# Patient Record
Sex: FEMALE | Race: WHITE | NOT HISPANIC OR LATINO | Employment: STUDENT | URBAN - METROPOLITAN AREA
[De-identification: names, ages, dates, MRNs, and addresses within clinical notes are randomized per-mention and may not be internally consistent; named-entity substitution may affect disease eponyms.]

---
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
## 2025-05-15 ENCOUNTER — APPOINTMENT (OUTPATIENT)
Dept: PRIMARY CARE | Facility: CLINIC | Age: 20
End: 2025-05-15
Payer: COMMERCIAL

## 2025-05-15 VITALS
DIASTOLIC BLOOD PRESSURE: 72 MMHG | SYSTOLIC BLOOD PRESSURE: 110 MMHG | HEIGHT: 65 IN | WEIGHT: 156.6 LBS | HEART RATE: 90 BPM | BODY MASS INDEX: 26.09 KG/M2

## 2025-05-15 DIAGNOSIS — E05.00 GRAVES' DISEASE: Primary | ICD-10-CM

## 2025-05-15 DIAGNOSIS — F41.9 ANXIETY: ICD-10-CM

## 2025-05-15 DIAGNOSIS — E89.0 POSTABLATIVE HYPOTHYROIDISM: ICD-10-CM

## 2025-05-15 DIAGNOSIS — J45.20 MILD INTERMITTENT ASTHMA WITHOUT COMPLICATION (HHS-HCC): ICD-10-CM

## 2025-05-15 PROBLEM — S99.921A RIGHT FOOT INJURY, INITIAL ENCOUNTER: Status: RESOLVED | Noted: 2025-05-15 | Resolved: 2025-05-15

## 2025-05-15 PROBLEM — S93.409A SPRAIN OF ANKLE: Status: RESOLVED | Noted: 2023-09-21 | Resolved: 2025-05-15

## 2025-05-15 PROBLEM — J02.9 SORE THROAT: Status: RESOLVED | Noted: 2025-05-15 | Resolved: 2025-05-15

## 2025-05-15 PROBLEM — S59.909A ELBOW INJURY: Status: RESOLVED | Noted: 2025-05-15 | Resolved: 2025-05-15

## 2025-05-15 PROBLEM — J30.1 SEASONAL ALLERGIC RHINITIS DUE TO POLLEN: Status: RESOLVED | Noted: 2024-05-23 | Resolved: 2025-05-15

## 2025-05-15 PROCEDURE — 1036F TOBACCO NON-USER: CPT | Performed by: STUDENT IN AN ORGANIZED HEALTH CARE EDUCATION/TRAINING PROGRAM

## 2025-05-15 PROCEDURE — 99204 OFFICE O/P NEW MOD 45 MIN: CPT | Performed by: STUDENT IN AN ORGANIZED HEALTH CARE EDUCATION/TRAINING PROGRAM

## 2025-05-15 PROCEDURE — 3008F BODY MASS INDEX DOCD: CPT | Performed by: STUDENT IN AN ORGANIZED HEALTH CARE EDUCATION/TRAINING PROGRAM

## 2025-05-15 RX ORDER — LIOTHYRONINE SODIUM 5 UG/1
TABLET ORAL
COMMUNITY

## 2025-05-15 RX ORDER — LEVOTHYROXINE SODIUM 137 UG/1
137 TABLET ORAL
COMMUNITY
Start: 2025-03-03

## 2025-05-15 RX ORDER — NORETHINDRONE ACETATE/ETHINYL ESTRADIOL AND FERROUS FUMARATE 1MG-20(21)
1 KIT ORAL
COMMUNITY
Start: 2025-04-03

## 2025-05-15 RX ORDER — SILVER SULFADIAZINE 10 G/1000G
CREAM TOPICAL 2 TIMES DAILY
COMMUNITY
Start: 2025-03-05

## 2025-05-15 RX ORDER — ALBUTEROL SULFATE 90 UG/1
2 INHALANT RESPIRATORY (INHALATION) EVERY 6 HOURS PRN
COMMUNITY
Start: 2024-09-09

## 2025-05-15 ASSESSMENT — PATIENT HEALTH QUESTIONNAIRE - PHQ9
10. IF YOU CHECKED OFF ANY PROBLEMS, HOW DIFFICULT HAVE THESE PROBLEMS MADE IT FOR YOU TO DO YOUR WORK, TAKE CARE OF THINGS AT HOME, OR GET ALONG WITH OTHER PEOPLE: NOT DIFFICULT AT ALL
2. FEELING DOWN, DEPRESSED OR HOPELESS: NOT AT ALL
SUM OF ALL RESPONSES TO PHQ9 QUESTIONS 1 AND 2: 0
1. LITTLE INTEREST OR PLEASURE IN DOING THINGS: NOT AT ALL

## 2025-05-15 ASSESSMENT — ENCOUNTER SYMPTOMS
ABDOMINAL PAIN: 0
RHINORRHEA: 0
LOSS OF SENSATION IN FEET: 0
CONSTIPATION: 0
DEPRESSION: 0
COUGH: 0
VOMITING: 0
DIARRHEA: 0
OCCASIONAL FEELINGS OF UNSTEADINESS: 0
WEAKNESS: 0
SHORTNESS OF BREATH: 0
CHILLS: 0
FEVER: 0
NERVOUS/ANXIOUS: 1
NUMBNESS: 0
HEADACHES: 0
DIZZINESS: 0
LIGHT-HEADEDNESS: 0
NAUSEA: 0
MYALGIAS: 0
FATIGUE: 0
SLEEP DISTURBANCE: 0
ARTHRALGIAS: 0
DYSPHORIC MOOD: 0

## 2025-05-15 ASSESSMENT — ANXIETY QUESTIONNAIRES
7. FEELING AFRAID AS IF SOMETHING AWFUL MIGHT HAPPEN: SEVERAL DAYS
GAD7 TOTAL SCORE: 10
6. BECOMING EASILY ANNOYED OR IRRITABLE: SEVERAL DAYS
3. WORRYING TOO MUCH ABOUT DIFFERENT THINGS: MORE THAN HALF THE DAYS
1. FEELING NERVOUS, ANXIOUS, OR ON EDGE: MORE THAN HALF THE DAYS
5. BEING SO RESTLESS THAT IT IS HARD TO SIT STILL: MORE THAN HALF THE DAYS
IF YOU CHECKED OFF ANY PROBLEMS ON THIS QUESTIONNAIRE, HOW DIFFICULT HAVE THESE PROBLEMS MADE IT FOR YOU TO DO YOUR WORK, TAKE CARE OF THINGS AT HOME, OR GET ALONG WITH OTHER PEOPLE: VERY DIFFICULT
2. NOT BEING ABLE TO STOP OR CONTROL WORRYING: MORE THAN HALF THE DAYS
4. TROUBLE RELAXING: NOT AT ALL

## 2025-05-15 NOTE — PROGRESS NOTES
Subjective   Patient ID: Dalia Holland is a 20 y.o. female who presents for New patient to establish care .    HPI     Patient is coming into establish care here in the office.  She was previously being followed by pediatrics.  No acute concerns or complaints today.    She does follow still with endocrinology through [facility].  They are continuing with monitoring and managing her medications.    Patient had been followed in the past for anxiety.  She had previously [facility] she was about 12-14. They thought more of a combination of anxiety, depression and early bipolar.  It had been discussed previously to go on an SSRI but she had decided to hold off.  She was also in counseling previously with [facility] that started in April 2025.  They have talked about that if symptoms worsen, referring to psychiatry.  She does not want to be on any medications at this time.    Her cat has always been able to help calm down her anxious symptoms.  She has had severe panic attacks in the past and the animal has helped in terms of providing comfort, as well as providing a weight on her while holding her to help her calm down.  Her familiarity with her cat also significant;y benefits her in terms of her feeling safe with the animal which also helps her to calm down.  She has had increased anxiety at school and is interested in being able to have her cat with her as she definitely feels like this would be beneficial especially with her managing her school workload.  She is not exactly sure if she would be able to have a roommate then with the cat.  She is also uncertain about having someone else with her cat as that thought also increases her anxiety.  Patient has been able to properly care for her cat for years and does not believe that she would have any issues doing this while maintaining her schoolwork.  She is worried that not being able to have the With her would be a detriment  "to her work at [employer].      Review of Systems   Constitutional:  Negative for chills, fatigue and fever.   HENT:  Negative for congestion and rhinorrhea.    Respiratory:  Negative for cough and shortness of breath.    Cardiovascular:  Negative for chest pain.   Gastrointestinal:  Negative for abdominal pain, constipation, diarrhea, nausea and vomiting.   Musculoskeletal:  Negative for arthralgias and myalgias.   Neurological:  Negative for dizziness, weakness, light-headedness, numbness and headaches.   Psychiatric/Behavioral:  Negative for dysphoric mood, self-injury, sleep disturbance and suicidal ideas. The patient is nervous/anxious.        Objective   /72 (BP Location: Right arm, Patient Position: Sitting)   Pulse 90   Ht 1.638 m (5' 4.5\")   Wt 71 kg (156 lb 9.6 oz)   BMI 26.47 kg/m²     Physical Exam  Vitals and nursing note reviewed.   Constitutional:       General: She is not in acute distress.     Appearance: Normal appearance. She is normal weight. She is not ill-appearing or toxic-appearing.   HENT:      Head: Normocephalic and atraumatic.   Cardiovascular:      Rate and Rhythm: Normal rate and regular rhythm.      Heart sounds: Normal heart sounds.   Pulmonary:      Effort: Pulmonary effort is normal.      Breath sounds: Normal breath sounds.   Abdominal:      General: Abdomen is flat. Bowel sounds are normal.      Palpations: Abdomen is soft.   Neurological:      General: No focal deficit present.      Mental Status: She is alert and oriented to person, place, and time. Mental status is at baseline.   Psychiatric:         Mood and Affect: Mood is anxious.         Behavior: Behavior normal.         Assessment/Plan   Problem List Items Addressed This Visit           ICD-10-CM    Anxiety F41.9    Diagnosed around ages 12-14. Currently following with counseling with [facility]. Defers medications at this time.         Asthma, intermittent ([facility]) J45.20    Chronic. Stable. Well controlled with " albuterol inhaler as needed.         Graves' disease - Primary E05.00    Diagnosed around 2019. Following with endocrinology still with [facility]. She had an ablation done in 2020. She is currently on Levoxyl and Cytomel.         Postablative hypothyroidism E89.0    Following with endocrinology still with [facility]. She had an ablation done in 2020 for Grave's Disease. She is currently on Levoxyl and Cytomel.          History and physical examination as above.  Patient presenting to establish care in the office.  Updated some of patient's medical history within the chart.  She will continue with regular follow-up with her endocrinologist through [facility] and they will continue with their current medications.  Discussed anxiety as this has been longstanding since she was about 12 years old. GAD7- score of 10 with rating very difficult with school. Discussed current treatment plan with regular therapy.  We did discuss consideration for medication in the future but she does not want to move forward with this at the time.  She states that her cat provide significant benefit with getting her through panic attacks as well as decreasing her overall levels of anxiety.  We do agree that this likely would be very beneficial to have with her at school especially since this also increases her anxiety by not having the cat there.  We will provide documentation with our concerns and recommendations.  Plan for wellness examination in the spring 2026.  We will plan for lab work at that point.  Discussed recommended dietary and exercise considerations as well.  She will call with any other questions or concerns or if any new issues arise.

## 2025-05-15 NOTE — ASSESSMENT & PLAN NOTE
Following with endocrinology still with [facility]. She had an ablation done in 2020 for Grave's Disease. She is currently on Levoxyl and Cytomel.

## 2025-05-15 NOTE — ASSESSMENT & PLAN NOTE
Diagnosed around ages 12-14. Currently following with counseling with [facility]. Defers medications at this time.

## 2025-05-15 NOTE — ASSESSMENT & PLAN NOTE
Diagnosed around 2019. Following with endocrinology still with [facility]. She had an ablation done in 2020. She is currently on Levoxyl and Cytomel.

## 2025-05-15 NOTE — PATIENT INSTRUCTIONS
Thank you for coming in and establishing with us today in the office.    I went ahead and updated some of your medical history as well.    Please continue with regular follow-up with your endocrinologist through [facility].    We also did discuss your anxiety as well as the history in terms of diagnosis  as well as current treatment.  Please continue with regular follow-up with your therapist on a regular basis which is usually my first recommended treatment as well.  It sounds like they have a plan in place that if anything progresses or worsens, they have in mind referrals that they can placed to psychiatry.  They can also call our office if they need us to send in any sort of referral if needed.  We did discuss the role of medications and possible need in the future.  This would largely depend on progression of symptoms and as long as you continue to slowly improve and are overall stable, we definitely do not need to start medication at this time.    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  Please send a message if you have not received word from us that this has been completed by this point next week.    You did just recently have your wellness examination already this year and would not be due until the spring 2026.  We can plan for follow-up for a wellness visit at that time.  You are free to get that scheduled today if you would like to.    We did discuss overall aspects of nutrition and I recommend plenty of protein as well as vegetables and fruits, avoiding processed foods and as much added sugar as possible and avoid drinking calories.  Keep up the work with excellent training in terms of weight training.  I would definitely recommend cardiovascular activity as well both steady-state as well as some higher intensity work as you are able to.  It does sound like you have had issues with heart in the past when they had  initially diagnosed you with Graves' disease.  If this continues, we can follow-up in the office for additional testing if needed.    Please call with any additional questions or concerns.    Thank you

## 2025-06-05 DIAGNOSIS — J45.20 MILD INTERMITTENT ASTHMA WITHOUT COMPLICATION (HHS-HCC): Primary | ICD-10-CM

## 2025-06-05 RX ORDER — ALBUTEROL SULFATE 90 UG/1
2 INHALANT RESPIRATORY (INHALATION) EVERY 6 HOURS PRN
Qty: 18 G | Refills: 1 | Status: SHIPPED | OUTPATIENT
Start: 2025-06-05

## 2025-06-27 ENCOUNTER — OFFICE VISIT (OUTPATIENT)
Dept: PRIMARY CARE | Facility: CLINIC | Age: 20
End: 2025-06-27
Payer: COMMERCIAL

## 2025-06-27 VITALS
OXYGEN SATURATION: 97 % | SYSTOLIC BLOOD PRESSURE: 110 MMHG | BODY MASS INDEX: 27.24 KG/M2 | HEART RATE: 88 BPM | DIASTOLIC BLOOD PRESSURE: 70 MMHG | WEIGHT: 161.2 LBS | RESPIRATION RATE: 18 BRPM

## 2025-06-27 DIAGNOSIS — J01.00 ACUTE NON-RECURRENT MAXILLARY SINUSITIS: Primary | ICD-10-CM

## 2025-06-27 DIAGNOSIS — J02.9 SORE THROAT: ICD-10-CM

## 2025-06-27 LAB
POC BINAX EXPIRATION: NORMAL
POC BINAX NOW COVID SERIAL NUMBER: NORMAL
POC RAPID STREP: NEGATIVE
POC SARS-COV-2 AG BINAX: NORMAL

## 2025-06-27 PROCEDURE — 87811 SARS-COV-2 COVID19 W/OPTIC: CPT | Performed by: NURSE PRACTITIONER

## 2025-06-27 PROCEDURE — 87880 STREP A ASSAY W/OPTIC: CPT | Performed by: NURSE PRACTITIONER

## 2025-06-27 PROCEDURE — 99213 OFFICE O/P EST LOW 20 MIN: CPT | Performed by: NURSE PRACTITIONER

## 2025-06-27 PROCEDURE — 1036F TOBACCO NON-USER: CPT | Performed by: NURSE PRACTITIONER

## 2025-06-27 RX ORDER — OXYMETAZOLINE HYDROCHLORIDE 0.05 G/100ML
2 SPRAY, METERED NASAL EVERY 12 HOURS PRN
Qty: 30 ML | Refills: 0 | Status: SHIPPED | OUTPATIENT
Start: 2025-06-27 | End: 2025-06-29

## 2025-06-27 RX ORDER — NORETHINDRONE ACETATE/ETHINYL ESTRADIOL 1MG-20MCG
1 KIT ORAL
COMMUNITY
Start: 2025-06-20

## 2025-06-27 ASSESSMENT — PATIENT HEALTH QUESTIONNAIRE - PHQ9
1. LITTLE INTEREST OR PLEASURE IN DOING THINGS: NOT AT ALL
SUM OF ALL RESPONSES TO PHQ9 QUESTIONS 1 AND 2: 0
2. FEELING DOWN, DEPRESSED OR HOPELESS: NOT AT ALL

## 2025-06-27 ASSESSMENT — COLUMBIA-SUICIDE SEVERITY RATING SCALE - C-SSRS
2. HAVE YOU ACTUALLY HAD ANY THOUGHTS OF KILLING YOURSELF?: NO
6. HAVE YOU EVER DONE ANYTHING, STARTED TO DO ANYTHING, OR PREPARED TO DO ANYTHING TO END YOUR LIFE?: NO
1. IN THE PAST MONTH, HAVE YOU WISHED YOU WERE DEAD OR WISHED YOU COULD GO TO SLEEP AND NOT WAKE UP?: NO

## 2025-06-27 NOTE — PROGRESS NOTES
Subjective   Patient ID: Dalia Holland is a 20 y.o. female who presents for URI (Started 2 days ago- drainage, clear phlegm, sinus pressure, slight ear pain ).    HPI     Patient reports 4 day history of sinus pressure, runny nose, nasal congestion, post-nasal drip, and slight bilateral ear pain, headaches, sore throat, and sneezing. Denies fevers, chills, body aches, chest pain, wheezing, shortness of breath, or cough. Denies nausea, vomiting, or diarrhea. She took Zyrtec, claritin, and benadryl without relief.     Review of Systems  ROS negative except as noted above in HPI.     Objective   /70 (BP Location: Left arm, Patient Position: Sitting, BP Cuff Size: Adult)   Pulse 88   Resp 18   Wt 73.1 kg (161 lb 3.2 oz)   SpO2 97%   BMI 27.24 kg/m²     Physical Exam  General: Alert and oriented, in no acute distress. Appears stated age, well-nourished, and well hydrated  HEENT:  - Head: Normocephalic and atraumatic   - Eyes: EOMI, PERRLA  - ENT: Hearing grossly intact. Mucus membranes pink and moist without lesions. Tonsils slightly enlarged with exudate. Good dentition. TMs gray. TTP of maxillary sinuses.   Neck: Supple. No stiffness. No thyromegaly or thyroid nodules  Heart: RRR. No murmurs, clicks, or rubs  Lungs: Unlabored breathing. CTAB with no crackles, wheezes, or rhonchi  Abdomen: Normal BS in all 4 quadrants. Soft, non-tender, non-distended, with no masses  Musculoskeletal: Normal gait and station  Neurological: Alert and oriented. No gross neurological deficits  Psychological: Appropriate mood and affect  Skin: No rash, abnormal lesions, cyanosis, or erythema     Assessment/Plan   Diagnoses and all orders for this visit:  Acute non-recurrent maxillary sinusitis  Sore throat  - POCT Rapid Strep A manually resulted: negative  - POCT BinaxNOW Covid-19 Ag Card manually resulted: negative  - oxymetazoline (Afrin, oxymetazoline,) 0.05 % nasal spray; Administer 2 sprays into each nostril every 12  hours if needed for congestion for up to 2 days. Do not use for more than 3 days.  - Recommend use of Flonase as well  - Rest, fluids  - If no improvement by 6/30, can call and I will send in antibiotic     ANH Mireles-CNP  [facility]

## 2025-06-30 ENCOUNTER — TELEPHONE (OUTPATIENT)
Dept: PRIMARY CARE | Facility: CLINIC | Age: 20
End: 2025-06-30
Payer: COMMERCIAL

## 2025-06-30 DIAGNOSIS — J01.00 ACUTE NON-RECURRENT MAXILLARY SINUSITIS: Primary | ICD-10-CM

## 2025-06-30 RX ORDER — AMOXICILLIN AND CLAVULANATE POTASSIUM 875; 125 MG/1; MG/1
875 TABLET, FILM COATED ORAL 2 TIMES DAILY
Qty: 14 TABLET | Refills: 0 | Status: SHIPPED | OUTPATIENT
Start: 2025-06-30 | End: 2025-07-07

## 2025-06-30 RX ORDER — FLUCONAZOLE 150 MG/1
TABLET ORAL
Qty: 3 TABLET | Refills: 0 | Status: SHIPPED | OUTPATIENT
Start: 2025-06-30

## 2025-06-30 NOTE — TELEPHONE ENCOUNTER
Pt states she is still experiencing since pressure, drainage & headache.  Would like to know if an antibiotic can be called in?  Also please call in diflucan for yeast infection caused by the antibiotic.    Dalia # [phone]  [facility] # [phone]

## 2025-07-21 ENCOUNTER — OFFICE VISIT (OUTPATIENT)
Dept: PRIMARY CARE | Facility: CLINIC | Age: 20
End: 2025-07-21
Payer: COMMERCIAL

## 2025-07-21 VITALS
BODY MASS INDEX: 27.24 KG/M2 | DIASTOLIC BLOOD PRESSURE: 78 MMHG | WEIGHT: 161.2 LBS | OXYGEN SATURATION: 98 % | HEART RATE: 84 BPM | RESPIRATION RATE: 19 BRPM | SYSTOLIC BLOOD PRESSURE: 120 MMHG

## 2025-07-21 DIAGNOSIS — M25.571 CHRONIC PAIN OF BOTH ANKLES: ICD-10-CM

## 2025-07-21 DIAGNOSIS — M79.671 BILATERAL FOOT PAIN: Primary | ICD-10-CM

## 2025-07-21 DIAGNOSIS — M25.561 ACUTE PAIN OF RIGHT KNEE: ICD-10-CM

## 2025-07-21 DIAGNOSIS — M25.572 CHRONIC PAIN OF BOTH ANKLES: ICD-10-CM

## 2025-07-21 DIAGNOSIS — M79.672 BILATERAL FOOT PAIN: Primary | ICD-10-CM

## 2025-07-21 DIAGNOSIS — G89.29 CHRONIC PAIN OF BOTH ANKLES: ICD-10-CM

## 2025-07-21 PROCEDURE — 99213 OFFICE O/P EST LOW 20 MIN: CPT | Performed by: STUDENT IN AN ORGANIZED HEALTH CARE EDUCATION/TRAINING PROGRAM

## 2025-07-21 RX ORDER — NORELGESTROMIN AND ETHINYL ESTRADIOL 35; 150 UG/D; UG/D
PATCH TRANSDERMAL
COMMUNITY
Start: 2025-07-03

## 2025-07-21 ASSESSMENT — ENCOUNTER SYMPTOMS
COUGH: 0
CHILLS: 0
HEADACHES: 0
FEVER: 0
SHORTNESS OF BREATH: 0
FATIGUE: 0
ARTHRALGIAS: 0
LIGHT-HEADEDNESS: 0
DIZZINESS: 0
ABDOMINAL PAIN: 0
MYALGIAS: 0
RHINORRHEA: 0

## 2025-07-21 ASSESSMENT — PATIENT HEALTH QUESTIONNAIRE - PHQ9
2. FEELING DOWN, DEPRESSED OR HOPELESS: NOT AT ALL
SUM OF ALL RESPONSES TO PHQ9 QUESTIONS 1 AND 2: 0
1. LITTLE INTEREST OR PLEASURE IN DOING THINGS: NOT AT ALL

## 2025-07-21 NOTE — PROGRESS NOTES
Subjective   Patient ID: Dalia Holland is a 20 y.o. female who presents for Knee Pain (Right knee x 3 days) and Ankle Pain (Bilateral pain into feet x 15 years getting worse).    Knee Pain     Ankle Pain          Patient is coming in with overall musculoskeletal pains.  She states that her right knee started hurting her about 3 days ago.  She denies any recent trauma or injury.    She states that she has been having some bilateral ankle and feet pain that has been going overall further about the past 15 years but she does feel like it seems to be getting worse.  She has been to see multiple doctors and physical    Review of Systems   Constitutional:  Negative for chills, fatigue and fever.   HENT:  Negative for congestion and rhinorrhea.    Respiratory:  Negative for cough and shortness of breath.    Cardiovascular:  Negative for chest pain.   Gastrointestinal:  Negative for abdominal pain.   Musculoskeletal:  Negative for arthralgias and myalgias.   Neurological:  Negative for dizziness, light-headedness and headaches.       Objective   /78 (BP Location: Left arm, Patient Position: Sitting, BP Cuff Size: Adult)   Pulse 84   Resp 19   Wt 73.1 kg (161 lb 3.2 oz)   SpO2 98%   BMI 27.24 kg/m²     Physical Exam  Vitals and nursing note reviewed.   Constitutional:       General: She is not in acute distress.     Appearance: Normal appearance. She is normal weight. She is not ill-appearing or toxic-appearing.   HENT:      Head: Normocephalic and atraumatic.     Cardiovascular:      Rate and Rhythm: Normal rate and regular rhythm.      Heart sounds: Normal heart sounds.   Pulmonary:      Effort: Pulmonary effort is normal.      Breath sounds: Normal breath sounds.     Neurological:      Mental Status: She is alert.         Assessment/Plan   Problem List Items Addressed This Visit    None  Visit Diagnoses         Codes      Bilateral foot pain    -  Primary M79.671, M79.672      Chronic pain of both  ankles     M25.571, G89.29, M25.572      Acute pain of right knee     M25.561

## 2025-08-05 ENCOUNTER — TELEMEDICINE (OUTPATIENT)
Dept: PRIMARY CARE | Facility: CLINIC | Age: 20
End: 2025-08-05
Payer: COMMERCIAL

## 2025-08-05 DIAGNOSIS — R11.2 NAUSEA AND VOMITING, UNSPECIFIED VOMITING TYPE: ICD-10-CM

## 2025-08-05 DIAGNOSIS — R52 BODY ACHES: ICD-10-CM

## 2025-08-05 DIAGNOSIS — A08.4 VIRAL GASTROENTERITIS: Primary | ICD-10-CM

## 2025-08-05 PROCEDURE — 99213 OFFICE O/P EST LOW 20 MIN: CPT | Performed by: STUDENT IN AN ORGANIZED HEALTH CARE EDUCATION/TRAINING PROGRAM

## 2025-08-05 RX ORDER — ONDANSETRON 4 MG/1
4 TABLET, ORALLY DISINTEGRATING ORAL EVERY 8 HOURS PRN
Qty: 21 TABLET | Refills: 0 | Status: SHIPPED | OUTPATIENT
Start: 2025-08-05 | End: 2025-08-12

## 2025-08-05 ASSESSMENT — ENCOUNTER SYMPTOMS
DIARRHEA: 0
NAUSEA: 1
SHORTNESS OF BREATH: 0
ABDOMINAL PAIN: 1
CHILLS: 1
COUGH: 0
FEVER: 1
RHINORRHEA: 0
CONSTIPATION: 0
APPETITE CHANGE: 1
FATIGUE: 1
VOMITING: 1

## 2025-08-05 NOTE — PROGRESS NOTES
Subjective   Patient ID: Dalia Holland is a 20 y.o. female who presents for Vomiting and Nausea.    Vomiting   Associated symptoms include abdominal pain, chills and a fever. Pertinent negatives include no chest pain, coughing or diarrhea.        Symptoms came up out of nowhere last night.  She was feeling off around 9:50-10:00.  Started with vomiting with food contents and then ended with dry heaves.  This was about every half hour or 45 minutes or so.  She has only had about a half cup of soup.  She was able to get down a Pedialyte.  She is feeling warm and chills and sweats. She jumping between 99 and 102.  Diarrhea is also going on as well. She is going also about every 45 minutes.  She ate some Chipotle which tasted funny just before her symptoms started.    Review of Systems   Constitutional:  Positive for appetite change, chills, fatigue and fever.   HENT:  Positive for congestion. Negative for rhinorrhea.    Respiratory:  Negative for cough and shortness of breath.    Cardiovascular:  Negative for chest pain.   Gastrointestinal:  Positive for abdominal pain, nausea and vomiting. Negative for constipation and diarrhea.       Objective   There were no vitals taken for this visit.    Physical Exam    Assessment/Plan   Problem List Items Addressed This Visit    None  Visit Diagnoses         Codes      Viral gastroenteritis    -  Primary A08.4      Nausea and vomiting, unspecified vomiting type     R11.2    Relevant Medications    ondansetron ODT (Zofran-ODT) 4 mg disintegrating tablet      Body aches     R52              This visit was completed via telemedicine (video) call. The visit was conducted between Dalia Holland, location [place]. and myself, Rolan Guillaume DO, location [place]. The patient verbally consented to the telehealth visit and verbally confirmed their location. All issues were discussed and addressed as in the clinical documentation, but no physical exam was performed. If it was  felt that the patient should be evaluated in a clinical setting, then they were directed there.  Spent 20:57 minutes with the patient over the telemedicine call and more than 50% of this time was spent in counseling and coordination of care.

## 2025-08-05 NOTE — LETTER
August 5, 2025     Patient: Dalia Holland   YOB: 2005   Date of Visit: 8/5/2025       To Whom It May Concern:    Dalia Holland was seen in my clinic on 8/5/2025. Please excuse Dalia for her absence from work on this day to make the appointment.    Due to current illness, please excuse for any days missed from 8/5/2025 through 8/7/2025.    If you have any questions or concerns, please don't hesitate to call.         Sincerely,         Rolan Guillaume, DO        CC: No Recipients

## 2025-08-29 ENCOUNTER — TELEMEDICINE (OUTPATIENT)
Dept: PRIMARY CARE | Facility: CLINIC | Age: 20
End: 2025-08-29
Payer: COMMERCIAL

## 2025-08-29 DIAGNOSIS — J30.89 ENVIRONMENTAL AND SEASONAL ALLERGIES: Primary | ICD-10-CM

## 2025-08-29 RX ORDER — LEVOCETIRIZINE DIHYDROCHLORIDE 5 MG/1
5 TABLET, FILM COATED ORAL EVERY EVENING
Qty: 30 TABLET | Refills: 1 | Status: SHIPPED | OUTPATIENT
Start: 2025-08-29

## 2025-08-29 RX ORDER — METHYLPREDNISOLONE 4 MG/1
TABLET ORAL
Qty: 21 TABLET | Refills: 0 | Status: SHIPPED | OUTPATIENT
Start: 2025-08-29 | End: 2025-09-04

## 2025-08-29 ASSESSMENT — PATIENT HEALTH QUESTIONNAIRE - PHQ9
SUM OF ALL RESPONSES TO PHQ9 QUESTIONS 1 AND 2: 0
2. FEELING DOWN, DEPRESSED OR HOPELESS: NOT AT ALL
1. LITTLE INTEREST OR PLEASURE IN DOING THINGS: NOT AT ALL

## 2025-08-29 ASSESSMENT — ENCOUNTER SYMPTOMS
SHORTNESS OF BREATH: 0
FATIGUE: 0
LOSS OF SENSATION IN FEET: 0
COUGH: 0
CHILLS: 0
FEVER: 0
OCCASIONAL FEELINGS OF UNSTEADINESS: 0
DEPRESSION: 0

## 2025-10-07 ENCOUNTER — APPOINTMENT (OUTPATIENT)
Dept: OTOLARYNGOLOGY | Facility: CLINIC | Age: 20
End: 2025-10-07
Payer: COMMERCIAL